# Patient Record
Sex: MALE | Race: WHITE | Employment: UNEMPLOYED | URBAN - METROPOLITAN AREA
[De-identification: names, ages, dates, MRNs, and addresses within clinical notes are randomized per-mention and may not be internally consistent; named-entity substitution may affect disease eponyms.]

---

## 2022-05-23 NOTE — H&P
45 y.o. male for open access colonoscopy for screening   Additional data for completion of the targeted pre-endoscopy H&P will be provided under 'H&P interval notes'. Please see that document which will be attached to this.   Rosalee Serra MD

## 2022-05-24 ENCOUNTER — HOSPITAL ENCOUNTER (OUTPATIENT)
Age: 52
Setting detail: OUTPATIENT SURGERY
Discharge: HOME | End: 2022-05-24
Attending: SPECIALIST | Admitting: SPECIALIST
Payer: MEDICARE

## 2022-05-24 ENCOUNTER — ANESTHESIA EVENT (OUTPATIENT)
Dept: ENDOSCOPY | Age: 52
End: 2022-05-24
Payer: MEDICARE

## 2022-05-24 ENCOUNTER — ANESTHESIA (OUTPATIENT)
Dept: ENDOSCOPY | Age: 52
End: 2022-05-24
Payer: MEDICARE

## 2022-05-24 VITALS
TEMPERATURE: 98.1 F | HEART RATE: 65 BPM | DIASTOLIC BLOOD PRESSURE: 83 MMHG | OXYGEN SATURATION: 100 % | BODY MASS INDEX: 30.52 KG/M2 | SYSTOLIC BLOOD PRESSURE: 117 MMHG | WEIGHT: 194.45 LBS | RESPIRATION RATE: 16 BRPM | HEIGHT: 67 IN

## 2022-05-24 PROCEDURE — 74011000250 HC RX REV CODE- 250: Performed by: NURSE ANESTHETIST, CERTIFIED REGISTERED

## 2022-05-24 PROCEDURE — 74011250636 HC RX REV CODE- 250/636: Performed by: NURSE ANESTHETIST, CERTIFIED REGISTERED

## 2022-05-24 PROCEDURE — 88305 TISSUE EXAM BY PATHOLOGIST: CPT

## 2022-05-24 PROCEDURE — 76060000031 HC ANESTHESIA FIRST 0.5 HR: Performed by: SPECIALIST

## 2022-05-24 PROCEDURE — 76040000019: Performed by: SPECIALIST

## 2022-05-24 PROCEDURE — 2709999900 HC NON-CHARGEABLE SUPPLY: Performed by: SPECIALIST

## 2022-05-24 PROCEDURE — 77030013992 HC SNR POLYP ENDOSC BSC -B: Performed by: SPECIALIST

## 2022-05-24 RX ORDER — PROPOFOL 10 MG/ML
INJECTION, EMULSION INTRAVENOUS
Status: DISCONTINUED | OUTPATIENT
Start: 2022-05-24 | End: 2022-05-24 | Stop reason: HOSPADM

## 2022-05-24 RX ORDER — FLUMAZENIL 0.1 MG/ML
0.2 INJECTION INTRAVENOUS
Status: DISCONTINUED | OUTPATIENT
Start: 2022-05-24 | End: 2022-05-24 | Stop reason: HOSPADM

## 2022-05-24 RX ORDER — MIDAZOLAM HYDROCHLORIDE 1 MG/ML
.25-5 INJECTION, SOLUTION INTRAMUSCULAR; INTRAVENOUS AS NEEDED
Status: DISCONTINUED | OUTPATIENT
Start: 2022-05-24 | End: 2022-05-24 | Stop reason: HOSPADM

## 2022-05-24 RX ORDER — SODIUM CHLORIDE 9 MG/ML
INJECTION, SOLUTION INTRAVENOUS
Status: DISCONTINUED | OUTPATIENT
Start: 2022-05-24 | End: 2022-05-24 | Stop reason: HOSPADM

## 2022-05-24 RX ORDER — LIDOCAINE HYDROCHLORIDE 20 MG/ML
INJECTION, SOLUTION EPIDURAL; INFILTRATION; INTRACAUDAL; PERINEURAL AS NEEDED
Status: DISCONTINUED | OUTPATIENT
Start: 2022-05-24 | End: 2022-05-24 | Stop reason: HOSPADM

## 2022-05-24 RX ORDER — FENTANYL CITRATE 50 UG/ML
25 INJECTION, SOLUTION INTRAMUSCULAR; INTRAVENOUS AS NEEDED
Status: DISCONTINUED | OUTPATIENT
Start: 2022-05-24 | End: 2022-05-24 | Stop reason: HOSPADM

## 2022-05-24 RX ORDER — PROPOFOL 10 MG/ML
INJECTION, EMULSION INTRAVENOUS AS NEEDED
Status: DISCONTINUED | OUTPATIENT
Start: 2022-05-24 | End: 2022-05-24 | Stop reason: HOSPADM

## 2022-05-24 RX ORDER — SODIUM CHLORIDE 9 MG/ML
50 INJECTION, SOLUTION INTRAVENOUS CONTINUOUS
Status: DISCONTINUED | OUTPATIENT
Start: 2022-05-24 | End: 2022-05-24 | Stop reason: HOSPADM

## 2022-05-24 RX ORDER — DEXTROMETHORPHAN/PSEUDOEPHED 2.5-7.5/.8
1.2 DROPS ORAL
Status: DISCONTINUED | OUTPATIENT
Start: 2022-05-24 | End: 2022-05-24 | Stop reason: HOSPADM

## 2022-05-24 RX ORDER — NALOXONE HYDROCHLORIDE 0.4 MG/ML
0.4 INJECTION, SOLUTION INTRAMUSCULAR; INTRAVENOUS; SUBCUTANEOUS
Status: DISCONTINUED | OUTPATIENT
Start: 2022-05-24 | End: 2022-05-24 | Stop reason: HOSPADM

## 2022-05-24 RX ADMIN — PROPOFOL 100 MG: 10 INJECTION, EMULSION INTRAVENOUS at 11:29

## 2022-05-24 RX ADMIN — PROPOFOL 120 MCG/KG/MIN: 10 INJECTION, EMULSION INTRAVENOUS at 11:29

## 2022-05-24 RX ADMIN — LIDOCAINE HYDROCHLORIDE 50 MG: 20 INJECTION, SOLUTION EPIDURAL; INFILTRATION; INTRACAUDAL; PERINEURAL at 11:29

## 2022-05-24 RX ADMIN — SODIUM CHLORIDE: 9 INJECTION, SOLUTION INTRAVENOUS at 11:20

## 2022-05-24 NOTE — ANESTHESIA POSTPROCEDURE EVALUATION
Procedure(s):  COLONOSCOPY  ENDOSCOPIC POLYPECTOMY. MAC    Anesthesia Post Evaluation        Patient location during evaluation: PACU  Patient participation: complete - patient participated  Level of consciousness: sleepy but conscious  Pain management: adequate  Airway patency: patent  Anesthetic complications: no  Cardiovascular status: acceptable and stable  Respiratory status: acceptable and unassisted  Hydration status: acceptable  Comments: The patient was seen and evaluated in the post-operative period. The time of my evaluation may not match the time of this note. The patient denied uncontrolled pain or nausea, and there were no significant complications evident. Erica Velasquez MD      Post anesthesia nausea and vomiting:  none  Final Post Anesthesia Temperature Assessment:  Normothermia (36.0-37.5 degrees C)      INITIAL Post-op Vital signs:   Vitals Value Taken Time   /83 05/24/22 1210   Temp 36.7 °C (98.1 °F) 05/24/22 1155   Pulse 67 05/24/22 1210   Resp 16 05/24/22 1210   SpO2 100 % 05/24/22 1210   Vitals shown include unvalidated device data.

## 2022-05-24 NOTE — PROCEDURES
[facility] TONI Perez MD  [phone]      May 24, 2022    Colonoscopy Procedure Note  Teagan Hopper  :  1970  [facility] Medical Record Number: [id number]    Indications:     Screening colonoscopy  PCP:  Fransisco Slater MD  Anesthesia/Sedation: Conscious Sedation/Moderate Sedation/GETA, see notes  Endoscopist:  Dr. Andrea Simpson  Complications:  None  Estimated Blood Loss:  None    Permit:  The indications, risks, benefits and alternatives were reviewed with the patient or their decision maker who was provided an opportunity to ask questions and all questions were answered. The specific risks of colonoscopy with conscious sedation were reviewed, including but not limited to anesthetic complication, bleeding, adverse drug reaction, missed lesion, infection, IV site reactions, and intestinal perforation which would lead to the need for surgical repair. Alternatives to colonoscopy including radiographic imaging, observation without testing, or laboratory testing were reviewed including the limitations of those alternatives. After considering the options and having all their questions answered, the patient or their decision maker provided both verbal and written consent to proceed. Procedure in Detail:  After obtaining informed consent, positioning of the patient in the left lateral decubitus position, and conduction of a pre-procedure pause or \"time out\" the endoscope was introduced into the anus and advanced to the terminal ileum. The quality of the colonic preparation was good. A careful inspection was made as the colonoscope was withdrawn, findings and interventions are described below. Findings:   6mm sessile rectal polyp removed with cold snare, retrieved, and hemostasis confirmed.     Specimens:    See above    Complications:   None; patient tolerated the procedure well.    Impression:  Rectal polyp    Recommendations:     - Await pathology. Thank you for entrusting me with this patient's care. Please do not hesitate to contact me with any questions or if I can be of assistance with any of your other patients' GI needs. Signed By: Emili Mora MD                        May 24, 2022      Surgical assistant none. Implants none unless specified.

## 2022-05-24 NOTE — PROGRESS NOTES
Endoscopy discharge instructions have been reviewed and given to patient. The patient verbalized understanding and acceptance of instructions. Dr. Ashok Mancera discussed with caregiver  procedure findings and next steps.

## 2022-05-24 NOTE — DISCHARGE INSTRUCTIONS
[address] [facility] TONI Murcia MD  [phone]      May 24, 2022    Pietro Palacios  YOB: 1970    COLONOSCOPY DISCHARGE INSTRUCTIONS    If there is redness at IV site you should apply warm compress to area. If redness or soreness persist contact Dr. Nahun Murcia' or your primary care doctor. There may be a slight amount of blood passed from the rectum. Gaseous discomfort may develop, but walking, belching will help relieve this. You may not operate a vehicle for 12 hours  You may not operate machinery or dangerous appliances for rest of today  You may not drink alcoholic beverages for 12 hours  Avoid making any critical decisions for 24 hours    DIET:  You may resume your normal diet, but some patients find that heavy or large meals may lead to indigestion or vomiting. I suggest a light meal as first food intake. MEDICATIONS:  The use of some over-the-counter pain medication may lead to bleeding after colon biopsies or polyp removal.  Tylenol (also called acetaminophen) is safe to take even if you have had colonoscopy with polyp removal.  Based on the procedure you had today you may not safely take aspirin or aspirin-like products for the next ten (10) days. Remember that Tylenol (also called acetaminophen) is safe to take after colonoscopy even if you have had biopsies or polyps removed. ACTIVITY:  You may resume your normal household activities, but it is recommended that you spend the remainder of the day resting -  avoid any strenuous activity. CALL DR. Juana Leigh' OFFICE IF:  Increasing pain, nausea, vomiting  Abdominal distension (swelling)  Significant new or increased bleeding (oral or rectal)  Fever/Chills  Chest pain/shortness of breath                       Additional instructions:   No aspirin 10 days.   We found and removed one small polyp today and I'll send a letter with those results in 7-10 days.     It was an honor to be your doctor today. Please let me or my office staff know if you have any feedback about today's procedure. Álvaro Milner MD    Colonoscopy saves lives, and can prevent colon cancer. Everyone aged 48 or older needs colonoscopy.   Tell your family and friends: get the test!

## 2022-05-24 NOTE — PERIOP NOTES
Received recovery report from Anesthesia team, see anesthesia note. ABD remains soft and non-tender post procedure. Pt has no complaints at this time and tolerated the procedure well. Endoscope was pre-cleaned at bedside immediately following procedure by Mariajose Pratt recovery report given to [address] [place] [address].

## 2022-05-24 NOTE — ANESTHESIA PREPROCEDURE EVALUATION
Relevant Problems   No relevant active problems       Anesthetic History   No history of anesthetic complications            Review of Systems / Medical History  Patient summary reviewed and pertinent labs reviewed    Pulmonary  Within defined limits                 Neuro/Psych         Psychiatric history  Pertinent negatives: No seizures, TIA and CVA  Comments: Intellectual disability living in group home Cardiovascular                Pertinent negatives: No past MI, angina and CHF  Exercise tolerance: >4 METS     GI/Hepatic/Renal  Within defined limits           Pertinent negatives: No renal disease   Endo/Other  Within defined limits        Pertinent negatives: No diabetes   Other Findings              Physical Exam    Airway  Mallampati: II  TM Distance: 4 - 6 cm  Neck ROM: normal range of motion   Mouth opening: Normal     Cardiovascular      Rate: normal         Dental  No notable dental hx       Pulmonary  Breath sounds clear to auscultation               Abdominal  GI exam deferred       Other Findings            Anesthetic Plan    ASA: 3  Anesthesia type: MAC          Induction: Intravenous  Anesthetic plan and risks discussed with: Patient      Patient has intellectual disability but is oriented to person, place and situation. Has historically signed his own consent forms. All questions sought and answered.

## 2022-05-24 NOTE — PROGRESS NOTES
Pietro Palacios  1970  [id number]    Situation:  Verbal report received from: Rodrigo Fontana RN   Procedure: Procedure(s):  COLONOSCOPY  ENDOSCOPIC POLYPECTOMY    Background:    Preoperative diagnosis: SCREENING  Postoperative diagnosis: Colon Polyp    :  Dr. Nahun Murcia   Assistant(s): Endoscopy Technician-1: Gabriel Avila  Endoscopy RN-1: Robyn Hightower RN    Specimens:   ID Type Source Tests Collected by Time Destination   1 : Rectal Polyp Preservative Rectum  Benjamin Lora MD 5/24/2022 1146 Pathology     H. Pylori  no    Assessment:  Intra-procedure medications       Anesthesia gave intra-procedure sedation and medications, see anesthesia flow sheet yes    Intravenous fluids: NS@ KVO     Vital signs stable   yes    Abdominal assessment: round and soft   yes    Recommendation:  Discharge patient per MD order  yes.   Return to floor  outpatient  Family or Friend   Caregiver   Permission to share finding with family or friend yes

## 2022-05-24 NOTE — INTERVAL H&P NOTE
Pre-Endoscopy H&P Update  Chief complaint/HPI/ROS:  The indication for the procedure, the patient's history and the patient's current medications are reviewed prior to the procedure and that data is reported on the H&P to which this document is attached. Any significant complaints with regard to organ systems will be noted, and if not mentioned then a review of systems is not contributory. Past Medical History:   Diagnosis Date    Anxiety     MR (mental retardation)     Nonverbal       History reviewed. No pertinent surgical history. Social   Social History     Tobacco Use    Smoking status: Never Smoker    Smokeless tobacco: Never Used   Substance Use Topics    Alcohol use: No      History reviewed. No pertinent family history. No Known Allergies   Prior to Admission Medications   Prescriptions Last Dose Informant Patient Reported? Taking? LORazepam (ATIVAN) 1 mg tablet 5/24/2022 at Unknown time  Yes Yes   Sig: Take  by mouth every four (4) hours as needed for Anxiety. asenapine (SAPHRIS) 10 mg subl 5/24/2022 at Unknown time  Yes Yes   Sig: by SubLINGual route. benztropine (COGENTIN) 1 mg tablet 5/24/2022 at Unknown time  Yes Yes   Sig: Take  by mouth two (2) times a day. busPIRone (BUSPAR) 10 mg tablet 5/24/2022 at Unknown time  Yes Yes   Sig: Take 10 mg by mouth three (3) times daily. cetaphil (CETAPHIL) topical cream   Yes No   Sig: Apply  to affected area as needed for Dry Skin. clonazePAM (KLONOPIN) 0.5 mg tablet 5/24/2022 at Unknown time  Yes Yes   Sig: Take  by mouth nightly as needed. desonide (DESOWEN) 0.05 % topical ointment   Yes No   Sig: Apply  to affected area two (2) times a day. gemfibrozil (LOPID) 600 mg tablet 5/24/2022 at Unknown time  Yes Yes   Sig: Take 600 mg by mouth two (2) times a day. levothyroxine (SYNTHROID) 75 mcg tablet 5/24/2022 at Unknown time  Yes Yes   Sig: Take  by mouth Daily (before breakfast).    loxapine (LOXITANE) 25 mg capsule 5/24/2022 at Unknown time Yes Yes   Sig: Take 25 mg by mouth three (3) times daily. multivitamin (ONE A DAY) tablet 5/24/2022 at Unknown time  Yes Yes   Sig: Take 1 Tab by mouth daily. oxybutynin (DITROPAN) 5 mg tablet 5/24/2022 at Unknown time  Yes Yes   Sig: Take 5 mg by mouth three (3) times daily. pantoprazole (PROTONIX) 40 mg tablet 5/23/2022 at Unknown time  Yes Yes   Sig: Take 40 mg by mouth daily. senna (SENNA) 8.6 mg tablet 5/23/2022 at Unknown time  Yes Yes   Sig: Take 1 Tab by mouth daily. Facility-Administered Medications: None       PHYSICAL EXAM:  The patient is examined immediately prior to the procedure. Visit Vitals  /86   Pulse 66   Temp 98.1 °F (36.7 °C)   Resp 24   Ht 5' 7\" (1.702 m)   Wt 88.2 kg (194 lb 7.1 oz)   SpO2 100%   BMI 30.45 kg/m²     Gen: Appears comfortable, no distress. Pulm: comfortable respirations with no abnormal audible breath sounds  HEART: well perfused, no abnormal audible heart sounds  GI: abdomen flat. PLAN:  Informed consent discussion held, patient afforded an opportunity to ask questions and all questions answered. After being advised of the risks, benefits, and alternatives, the patient requested that we proceed and indicated so on a written consent form. Will proceed with procedure as planned.   Anjali Meyer MD

## 2024-10-02 ENCOUNTER — OFFICE VISIT (OUTPATIENT)
Age: 54
End: 2024-10-02
Payer: COMMERCIAL

## 2024-10-02 VITALS
OXYGEN SATURATION: 98 % | TEMPERATURE: 97.8 F | BODY MASS INDEX: 29.65 KG/M2 | HEIGHT: 67 IN | SYSTOLIC BLOOD PRESSURE: 111 MMHG | DIASTOLIC BLOOD PRESSURE: 77 MMHG | HEART RATE: 68 BPM | WEIGHT: 188.9 LBS

## 2024-10-02 DIAGNOSIS — G24.01 TARDIVE DYSKINESIA: Primary | ICD-10-CM

## 2024-10-02 DIAGNOSIS — N32.81 OVERACTIVE BLADDER: ICD-10-CM

## 2024-10-02 DIAGNOSIS — G21.19 PARKINSONISM DUE TO DRUG (HCC): ICD-10-CM

## 2024-10-02 DIAGNOSIS — F31.70 BIPOLAR AFFECTIVE DISORDER IN REMISSION (HCC): ICD-10-CM

## 2024-10-02 PROCEDURE — 3017F COLORECTAL CA SCREEN DOC REV: CPT | Performed by: NURSE PRACTITIONER

## 2024-10-02 PROCEDURE — G8427 DOCREV CUR MEDS BY ELIG CLIN: HCPCS | Performed by: NURSE PRACTITIONER

## 2024-10-02 PROCEDURE — 1036F TOBACCO NON-USER: CPT | Performed by: NURSE PRACTITIONER

## 2024-10-02 PROCEDURE — G8419 CALC BMI OUT NRM PARAM NOF/U: HCPCS | Performed by: NURSE PRACTITIONER

## 2024-10-02 PROCEDURE — 99203 OFFICE O/P NEW LOW 30 MIN: CPT | Performed by: NURSE PRACTITIONER

## 2024-10-02 PROCEDURE — G8484 FLU IMMUNIZE NO ADMIN: HCPCS | Performed by: NURSE PRACTITIONER

## 2024-10-02 RX ORDER — HALOPERIDOL 2 MG/1
TABLET ORAL 2 TIMES DAILY
COMMUNITY

## 2024-10-02 RX ORDER — CLOZAPINE 100 MG/1
100 TABLET ORAL
COMMUNITY
Start: 2024-08-31

## 2024-10-02 RX ORDER — CARBIDOPA/LEVODOPA 10MG-100MG
1 TABLET ORAL
COMMUNITY
Start: 2023-10-23

## 2024-10-02 RX ORDER — CLOZAPINE 25 MG/1
25 TABLET ORAL 2 TIMES DAILY
COMMUNITY

## 2024-10-02 RX ORDER — THIAMINE MONONITRATE (VIT B1) 100 MG
100 TABLET ORAL DAILY
COMMUNITY

## 2024-10-02 NOTE — PROGRESS NOTES
Shar Velázquez is a 54 y.o. male , id x 2(name and ). Reviewed record, history, and  medications.    This patient is accompanied in the office by his [facility] caregiver Lissette Gonsalves .I have received verbal consent from Shar Velázquez to discuss any/all medical information while they are present in the room.    Chief Complaint   Patient presents with    New Patient     Has an upcoming foot surgery (date unknown) to \"break his foot\"     Vitals:    10/02/24 0925   BP: 111/77   Site: Left Upper Arm   Position: Sitting   Cuff Size: Large Adult   Pulse: 68   Temp: 97.8 °F (36.6 °C)   SpO2: 98%   Weight: 85.7 kg (188 lb 14.4 oz)   Height: 1.702 m (5' 7\")       Non-Fasting    \"Have you been to the ER, urgent care clinic since your last visit?  Hospitalized since your last visit?\"    NO    “Have you seen or consulted any other health care providers outside of [facility] since your last visit?”    NO        2022    11:01 AM   Amb Fall Risk Assessment and TUG Test   Total Score 3          No data to display                   No data to display              Social Determinants of Health     Tobacco Use: Low Risk  (10/2/2024)    Patient History     Smoking Tobacco Use: Never     Smokeless Tobacco Use: Never     Passive Exposure: Not on file   Alcohol Use: Not on file   Financial Resource Strain: Not on file   Food Insecurity: Not on file   Transportation Needs: Not on file   Physical Activity: Not on file   Stress: Not on file   Social Connections: Not on file   Intimate Partner Violence: Not on file   Depression: Not on file   Housing Stability: Not on file   Interpersonal Safety: Not on file   Utilities: Not on file       Click Here for Release of Records Request

## 2024-10-02 NOTE — PROGRESS NOTES
Progress Note        New pt here to establish care  Followed by neurology, psychiatry           Subjective:     Patient's medications, allergies, past medical, surgical, social and family histories were reviewed and updated as appropriate.    Review of Systems   All other systems reviewed and are negative.       Objective:     Vitals:    10/02/24 0925   BP: 111/77   Site: Left Upper Arm   Position: Sitting   Cuff Size: Large Adult   Pulse: 68   Temp: 97.8 °F (36.6 °C)   SpO2: 98%   Weight: 85.7 kg (188 lb 14.4 oz)   Height: 1.702 m (5' 7\")       Physical Exam  Vitals and nursing note reviewed.   Constitutional:       Appearance: Normal appearance. He is obese.   HENT:      Head: Normocephalic and atraumatic.   Eyes:      Conjunctiva/sclera: Conjunctivae normal.   Cardiovascular:      Rate and Rhythm: Normal rate and regular rhythm.      Pulses: Normal pulses.      Heart sounds: Normal heart sounds.   Pulmonary:      Effort: Pulmonary effort is normal.      Breath sounds: Normal breath sounds.   Musculoskeletal:      Cervical back: Normal range of motion and neck supple. No rigidity.      Right lower leg: No edema.      Left lower leg: No edema.   Skin:     General: Skin is warm and dry.      Findings: No rash (over visualized areas).   Neurological:      Mental Status: He is alert.      Comments: At baseline   Psychiatric:         Mood and Affect: Mood normal.         Behavior: Behavior normal.      Comments: At baseline         Assessment/ Plan:     1. 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 Bipolar affective disorder in remission (HCC)  4. Overactive bladder      Medication Side Effects and Warnings were discussed with patient    Return in about 3 months (around 1/2/2025) for RECHECK, LABS.     On this date 10/2/2024 I have spent 30 minutes reviewing previous notes, test results and face to face with the patient discussing the diagnosis and importance of compliance with the treatment plan as

## 2024-11-08 ENCOUNTER — CLINICAL DOCUMENTATION (OUTPATIENT)
Age: 54
End: 2024-11-08

## 2024-11-25 ENCOUNTER — TELEPHONE (OUTPATIENT)
Facility: CLINIC | Age: 54
End: 2024-11-25

## 2024-11-25 NOTE — TELEPHONE ENCOUNTER
----- Message from Arden JUAN sent at 11/25/2024 11:23 AM EST -----  Regarding: ECC Appointment Request  ECC Appointment Request    Patient needs appointment for ECC Appointment Type: Hospital Follow Up.    Patient Requested Dates(s): NA  Patient Requested Time: NA  Provider Name: Carlos Kelli M, DELMY - ZULEIMA    Reason for Appointment Request: Other Would like to set a follow up visit after foot surgery  --------------------------------------------------------------------------------------------------------------------------    Relationship to Patient: Covered Entity Lissette Care Giver     Call Back Information: OK to leave message on voicemail  Preferred Call Back Number: Phone [phone] or [phone] (home)

## 2024-12-06 ENCOUNTER — OFFICE VISIT (OUTPATIENT)
Facility: CLINIC | Age: 54
End: 2024-12-06

## 2024-12-06 VITALS
HEIGHT: 67 IN | SYSTOLIC BLOOD PRESSURE: 109 MMHG | DIASTOLIC BLOOD PRESSURE: 75 MMHG | HEART RATE: 90 BPM | BODY MASS INDEX: 29.59 KG/M2 | OXYGEN SATURATION: 100 %

## 2024-12-06 DIAGNOSIS — Z09 HOSPITAL DISCHARGE FOLLOW-UP: Primary | ICD-10-CM

## 2024-12-06 DIAGNOSIS — M21.6X2 ACQUIRED CAVOVARUS DEFORMITY OF FOOT, LEFT: ICD-10-CM

## 2024-12-06 RX ORDER — CLONAZEPAM 0.5 MG/1
0.5 TABLET ORAL 2 TIMES DAILY
COMMUNITY

## 2024-12-06 NOTE — PROGRESS NOTES
Post-Discharge Transitional Care  Follow Up      Shar Velázquez   YOB: 1970    Date of Office Visit:  12/6/2024  Date of Hospital Admission: 6/20/14  Date of Hospital Discharge: 6/20/14  Risk of hospital readmission (high >=14%. Medium >=10%) :No data recorded    Care management risk score Rising risk (score 2-5) and Complex Care (Scores >=6): No Risk Score On File     Non face to face  following discharge, date last encounter closed (first attempt may have been earlier): *No documented post hospital discharge outreach found in the last 14 days    Call initiated 2 business days of discharge: *No response recorded in the last 14 days    ASSESSMENT/PLAN:   Hospital discharge follow-up  -     ND DISCHARGE MEDS RECONCILED W/ CURRENT OUTPATIENT MED LIST    Medical Decision Making: moderate complexity  Return if symptoms worsen or fail to improve.    On this date 12/6/2024 I have spent 35 minutes reviewing previous notes, test results and face to face with the patient discussing the diagnosis and importance of compliance with the treatment plan as well as documenting on the day of the visit.       Subjective:   HPI:  Follow up of Hospital problems/diagnosis(es): f/u per foot surgery - Dr. Bella with [facility]  Arthrodesis - ankle, open  Surgery done 10/10/24  Caregiver reports that pt is doing well  Still wearing a cast on the foot  Pt able to ambulate on foot but pt does report some pain when he puts pressure on it  Has not seen the ortho provider     Inpatient course: Discharge summary reviewed- see chart.    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

## 2024-12-06 NOTE — PROGRESS NOTES
Shar Velázquez is a 54 y.o. male , id x 2(name and ). Reviewed record, history, and  medications.    This patient is accompanied in the office by his  Caregiver Joshua.I have received verbal consent from Shar Velázquez to discuss any/all medical information while they are present in the room.    Chief Complaint   Patient presents with    Follow-Up from Hospital     10/10/24 pt had foot surgery at [facility] with Dr. Segundo Bella     ARTHRODESIS ANKLE OPEN; ARTHRODESIS SUBTALAR; OSTEOT TARSAL OTH/THN CALCANEUS/TALUS W/AGRFT;  FUSION, JOINT, ANKLE, OPEN; (Arthrodesis, ankle, open);  FUSION, SUBTALAR JOINT, OPEN; OSTEOTOMY, TARSAL BONE     Pt then had a f/up appt with Dr. Bella on 10/28/24, and was advised to f/up with him in 4 weeks for repeat XR's     Vitals:    24 1330   BP: 109/75   Pulse: 90   SpO2: 100%   Height: 1.702 m (5' 7\")       Non-Fasting    \"Have you been to the ER, urgent care clinic since your last visit?  Hospitalized since your last visit?\"    NO    “Have you seen or consulted any other health care providers outside of [facility] since your last visit?”    NO        2022    11:01 AM   Amb Fall Risk Assessment and TUG Test   Total Score 3          No data to display                   No data to display              Social Determinants of Health     Tobacco Use: Low Risk  (2024)    Patient History     Smoking Tobacco Use: Never     Smokeless Tobacco Use: Never     Passive Exposure: Not on file   Alcohol Use: Not At Risk (10/10/2024)    Received from [facility]    AUDIT-C     Frequency of Alcohol Consumption: Never     Average Number of Drinks: Patient does not drink     Frequency of Binge Drinking: Never   Financial Resource Strain: Not on file   Food Insecurity: No Food Insecurity (10/14/2024)    Received from [facility]    Hunger Vital Sign     Worried About Running Out of Food in the Last Year: Never true     Ran Out of Food in the Last Year: Never true

## 2025-06-04 ENCOUNTER — TELEPHONE (OUTPATIENT)
Facility: CLINIC | Age: 55
End: 2025-06-04

## 2025-06-04 NOTE — TELEPHONE ENCOUNTER
----- Message from Vanessa SLOAN sent at 6/4/2025  3:10 PM EDT -----  Regarding: E[facility] Appointment Request  ECC Appointment Request    Patient needs appointment for ECC Appointment Type: New to Provider.    Patient Requested Dates(s): Any time this month  Patient Requested Time: Anytime  Provider Name:Carlos Kelli M, APRN - CNP    Reason for Appointment Request: New Patient - Available appointments did not meet patient need. Patient the need Physical camp.   --------------------------------------------------------------------------------------------------------------------------    Relationship to Patient: Covered Entity / Caregiver     Call Back Information: OK to leave message on voicemail  Preferred Call Back Number: Phone [phone]

## 2025-07-17 ENCOUNTER — OFFICE VISIT (OUTPATIENT)
Age: 55
End: 2025-07-17
Payer: COMMERCIAL

## 2025-07-17 VITALS
OXYGEN SATURATION: 99 % | TEMPERATURE: 97.4 F | SYSTOLIC BLOOD PRESSURE: 129 MMHG | RESPIRATION RATE: 16 BRPM | DIASTOLIC BLOOD PRESSURE: 85 MMHG | BODY MASS INDEX: 32.07 KG/M2 | HEART RATE: 69 BPM | HEIGHT: 63 IN | WEIGHT: 181 LBS

## 2025-07-17 DIAGNOSIS — Z11.1 SCREENING FOR TUBERCULOSIS: ICD-10-CM

## 2025-07-17 DIAGNOSIS — F79 INTELLECTUAL DISABILITY: ICD-10-CM

## 2025-07-17 DIAGNOSIS — Z02.89 ENCOUNTER FOR COMPLETION OF FORM WITH PATIENT: ICD-10-CM

## 2025-07-17 DIAGNOSIS — F31.70 BIPOLAR AFFECTIVE DISORDER IN REMISSION: ICD-10-CM

## 2025-07-17 DIAGNOSIS — G21.19 PARKINSONISM DUE TO DRUG: ICD-10-CM

## 2025-07-17 DIAGNOSIS — G24.01 TARDIVE DYSKINESIA: Primary | ICD-10-CM

## 2025-07-17 PROCEDURE — G8417 CALC BMI ABV UP PARAM F/U: HCPCS | Performed by: INTERNAL MEDICINE

## 2025-07-17 PROCEDURE — 1036F TOBACCO NON-USER: CPT | Performed by: INTERNAL MEDICINE

## 2025-07-17 PROCEDURE — G8427 DOCREV CUR MEDS BY ELIG CLIN: HCPCS | Performed by: INTERNAL MEDICINE

## 2025-07-17 PROCEDURE — 99204 OFFICE O/P NEW MOD 45 MIN: CPT | Performed by: INTERNAL MEDICINE

## 2025-07-17 PROCEDURE — 3017F COLORECTAL CA SCREEN DOC REV: CPT | Performed by: INTERNAL MEDICINE

## 2025-07-17 SDOH — ECONOMIC STABILITY: FOOD INSECURITY: WITHIN THE PAST 12 MONTHS, YOU WORRIED THAT YOUR FOOD WOULD RUN OUT BEFORE YOU GOT MONEY TO BUY MORE.: NEVER TRUE

## 2025-07-17 SDOH — ECONOMIC STABILITY: FOOD INSECURITY: WITHIN THE PAST 12 MONTHS, THE FOOD YOU BOUGHT JUST DIDN'T LAST AND YOU DIDN'T HAVE MONEY TO GET MORE.: NEVER TRUE

## 2025-07-17 ASSESSMENT — PATIENT HEALTH QUESTIONNAIRE - PHQ9: DEPRESSION UNABLE TO ASSESS: FUNCTIONAL CAPACITY MOTIVATION LIMITS ACCURACY

## 2025-07-17 NOTE — PATIENT INSTRUCTIONS
Patient had a hospital discharge follow-up visit with KACEY Gonzalez on 10-10-24.    The August visit scheduled with her can be a follow-up appt there if prefer to maintain care there.

## 2025-07-17 NOTE — PROGRESS NOTES
Shar Velázquez (: 1970) is a 55 y.o. male, new patient, here for evaluation of the following chief complaint(s):  Chief Complaint   Patient presents with    New Patient     Patient needs form completed to attend [place]       Assessment and Plan:      Diagnosis Orders   1. 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 Bipolar affective disorder in remission        4. Intellectual disability        5. Screening for tuberculosis  Quantiferon, Incubated      6. Encounter for completion of form with patient--[employer] [address] [place] [address] Form            1-4,6:  Forms completed for [place] as reviewed with caregiver at visit.  Forms completed except for screening as above.    5:  Screenings reviewed at visit.    Assessment & Plan  1-4. Establishment of care.  He is here to establish care as a new patient. His medication list includes primarily mental health medications: two different doses of clonazepam, carbidopa-levodopa, benztropine, clozapine, Haldol, levothyroxine, and gemfibrozil. However, there are no recent labs or lab monitoring for these medications in his lab reports. The most recent Care Everywhere labs from [facility] include a basic metabolic panel from 10/16/2024, which is normal except for a glucose level of 107 recorded at 6:53 AM. A CBC from 10/13/2024 indicates mild anemia with a hemoglobin level of 12.8 (lower limit of normal is 13.3) and a slightly elevated MCV of 96. Platelet count is 170. A comprehensive metabolic panel from 2024 shows a glucose level of 63, creatinine of 1.39, and an eGFR of 60 with normal liver enzymes except for an elevated alkaline phosphatase level of 199. His A1c from [facility] dated 2024 is 5.1. Vitamin D as 25-hydroxy level from [facility] dated 2024 is normal at 38.1. TSH from [facility] dated 2024 is also normal at 1.18. Lipid levels have been monitored for his gemfibrozil treatment, and no other thyroid testing has been conducted apart from the September [facility] lab

## 2025-07-17 NOTE — PROGRESS NOTES
RM:16    Chief Complaint   Patient presents with    New Patient     Patient needs form completed to attend camp       Fasting No    Vitals:    07/17/25 1341   BP: 129/85   BP Site: Right Upper Arm   Patient Position: Sitting   BP Cuff Size: Small Adult   Pulse: 69   Resp: 16   Temp: 97.4 °F (36.3 °C)   TempSrc: Oral   SpO2: 99%   Weight: 82.1 kg (181 lb)   Height: 1.6 m (5' 3\")        Have you been to the ER, urgent care clinic since your last visit?  Hospitalized since your last visit?   NO    Have you seen or consulted any other health care providers outside our system since your last visit?   NO              Click Here for Release of Records Request   AVS  education, follow up, and recommendations provided and addressed with patient.  services used to advise patient

## 2025-07-23 LAB
M TB IFN-G BLD-IMP: NEGATIVE
M TB IFN-G CD4+ T-CELLS BLD-ACNC: 0.18 IU/ML
M TBIFN-G CD4+ CD8+T-CELLS BLD-ACNC: 0.18 IU/ML
QUANTIFERON CRITERIA: NORMAL
QUANTIFERON MITOGEN VALUE: >10 IU/ML
QUANTIFERON NIL VALUE: 0.56 IU/ML

## 2025-07-24 ENCOUNTER — TELEPHONE (OUTPATIENT)
Age: 55
End: 2025-07-24

## 2025-07-24 NOTE — TELEPHONE ENCOUNTER
Received the fax number from patient's caregiver Lissette Gonsalves.     Faxed patient's camp forms to the group home and the camp location as requested.

## 2025-07-24 NOTE — TELEPHONE ENCOUNTER
Called the patients group home and spoke with a caregiver to discuss lab results.     Per Dr. Sumner-- Quantiferon (tuberculosis) testing normal/negative--done on 7-17-25.    Caregiver was in understanding. Caregiver states she will call back to give a fax number so that the paperwork can be sent in time for patient's camp.

## 2025-07-24 NOTE — TELEPHONE ENCOUNTER
Please contact group home caregiver and review recent labs and recommendations.    Letter printed to mail to pt with results.    Please clarify if needs letter prior to upcoming camp.  Camp forms completed at visit.  Can mail or fax to group home if preferred.

## 2025-07-24 NOTE — TELEPHONE ENCOUNTER
ROSEMARIE CISNEROS , PT CAREGIVER CALLED IN REGARDS OF PT PHYSICAL. ROSEMARIE PROCEED GIVING ME THE FAX NUMBER SO THE PROVIDER NURSE CAN FAX THE PHYSICAL RESULTS TO THE GROUP HOME.      FAX NUMBER: [phone]

## 2025-08-01 ENCOUNTER — TELEPHONE (OUTPATIENT)
Facility: CLINIC | Age: 55
End: 2025-08-01

## 2025-08-06 ENCOUNTER — OFFICE VISIT (OUTPATIENT)
Facility: CLINIC | Age: 55
End: 2025-08-06
Payer: COMMERCIAL

## 2025-08-06 VITALS
SYSTOLIC BLOOD PRESSURE: 126 MMHG | HEIGHT: 63 IN | BODY MASS INDEX: 32.41 KG/M2 | OXYGEN SATURATION: 98 % | HEART RATE: 73 BPM | DIASTOLIC BLOOD PRESSURE: 88 MMHG | TEMPERATURE: 98.2 F | WEIGHT: 182.9 LBS

## 2025-08-06 DIAGNOSIS — F31.70 BIPOLAR AFFECTIVE DISORDER IN REMISSION: ICD-10-CM

## 2025-08-06 DIAGNOSIS — G24.01 TARDIVE DYSKINESIA: ICD-10-CM

## 2025-08-06 DIAGNOSIS — G21.19 PARKINSONISM DUE TO DRUG: Primary | ICD-10-CM

## 2025-08-06 PROCEDURE — G8427 DOCREV CUR MEDS BY ELIG CLIN: HCPCS | Performed by: NURSE PRACTITIONER

## 2025-08-06 PROCEDURE — 3017F COLORECTAL CA SCREEN DOC REV: CPT | Performed by: NURSE PRACTITIONER

## 2025-08-06 PROCEDURE — 1036F TOBACCO NON-USER: CPT | Performed by: NURSE PRACTITIONER

## 2025-08-06 PROCEDURE — G8417 CALC BMI ABV UP PARAM F/U: HCPCS | Performed by: NURSE PRACTITIONER

## 2025-08-06 PROCEDURE — 99214 OFFICE O/P EST MOD 30 MIN: CPT | Performed by: NURSE PRACTITIONER

## 2025-09-03 RX ORDER — MULTIVITAMIN WITH FOLIC ACID 400 MCG
1 TABLET ORAL DAILY
Qty: 90 TABLET | Refills: 3 | Status: SHIPPED | OUTPATIENT
Start: 2025-09-03

## (undated) DEVICE — SYR 3ML LL TIP 1/10ML GRAD --

## (undated) DEVICE — Device

## (undated) DEVICE — BLUNTFILL WITH FILTER: Brand: MONOJECT

## (undated) DEVICE — SNARE ENDOSCP M L240CM W27MM SHTH DIA2.4MM CHN 2.8MM OVL

## (undated) DEVICE — Device: Brand: BIATAIN SILICONE LITE

## (undated) DEVICE — SOLIDIFIER MEDC 1200ML -- CONVERT TO 356117

## (undated) DEVICE — CUFF RMFG BP INF SZ 11 DISP -- LAWSON OEM ITEM 238915

## (undated) DEVICE — SYR 5ML 1/5 GRAD LL NSAF LF --

## (undated) DEVICE — CONTAINER SPEC 20 ML LID NEUT BUFF FORMALIN 10 % POLYPR STS

## (undated) DEVICE — KIT COLON W/ 1.1OZ LUB AND 2 END

## (undated) DEVICE — POLYP TRAP: Brand: TRAPEASE®

## (undated) DEVICE — ELECTRODE,RADIOTRANSLUCENT,FOAM,3PK: Brand: MEDLINE

## (undated) DEVICE — 3M™ CUROS™ DISINFECTING CAP FOR NEEDLELESS CONNECTORS 270/CARTON 20 CARTONS/CASE CFF1-270: Brand: CUROS™

## (undated) DEVICE — CATH IV AUTOGRD BC PNK 20GA 25 -- INSYTE

## (undated) DEVICE — SET GRAV CK VLV NEEDLESS ST 3 GANGED 4WAY STPCOCK HI FLO 10

## (undated) DEVICE — 1200 GUARD II KIT W/5MM TUBE W/O VAC TUBE: Brand: GUARDIAN

## (undated) DEVICE — SET ADMIN 16ML TBNG L100IN 2 Y INJ SITE IV PIGGY BK DISP

## (undated) DEVICE — BAG BELONG PT PERS CLEAR HANDL

## (undated) DEVICE — BAG SPEC BIOHZRD 10 X 10 IN --